# Patient Record
Sex: MALE | Race: WHITE | NOT HISPANIC OR LATINO | URBAN - METROPOLITAN AREA
[De-identification: names, ages, dates, MRNs, and addresses within clinical notes are randomized per-mention and may not be internally consistent; named-entity substitution may affect disease eponyms.]

---

## 2021-11-29 ENCOUNTER — TELEHEALTH PROVIDED OTHER THAN IN PATIENT'S HOME (OUTPATIENT)
Dept: URBAN - METROPOLITAN AREA TELEHEALTH 7 | Facility: TELEHEALTH | Age: 56
End: 2021-11-29

## 2021-11-29 VITALS — WEIGHT: 254 LBS | HEIGHT: 71 IN

## 2021-11-29 DIAGNOSIS — K92.1 MELENA: ICD-10-CM

## 2021-11-29 DIAGNOSIS — R19.5 OTHER FECAL ABNORMALITIES: ICD-10-CM

## 2021-11-29 DIAGNOSIS — R15.2 FECAL URGENCY: ICD-10-CM

## 2021-11-29 PROCEDURE — 99204 OFFICE O/P NEW MOD 45 MIN: CPT | Mod: 95 | Performed by: INTERNAL MEDICINE

## 2021-11-29 NOTE — SERVICEHPINOTES
PATIENT VERIFIED BY DATE OF BIRTH AND NAME. Patient has been consented for this telecommunication visit.   55 yo male with evaluation - has intermittent loose stools.  Pt states he has worse gas and some liquid stools.  Pt tried taking Metamucil which worked in the past.  Pt states the loose stools are urgent.  Pt states worse over the past several months.  Pt has issues with hemorrhoids as well.  Pt states he thinks he has external hemorrhoids.  Pt PCP told him to try Sitz Baths.  Pt does have some blood in his stool/BRBPR.  Pt states he is o/w doing ok.  Pt states his appetite and weight are normal.  Pt denies any pain for the most part.  Pt has been changing his meds - LT4 for his Thyroid disease.  Pt states has pain in his neck at times - seen by PCP for this as well.  Pt has Multiple Myeloma since 2010 - tx in at [facility].  No new meds since 2019 except thyroid med change.

## 2021-12-07 ENCOUNTER — OFFICE (OUTPATIENT)
Dept: URBAN - METROPOLITAN AREA CLINIC 34 | Facility: CLINIC | Age: 56
End: 2021-12-07

## 2021-12-07 PROCEDURE — 00038: CPT | Performed by: INTERNAL MEDICINE

## 2022-01-04 ENCOUNTER — OFFICE (OUTPATIENT)
Dept: URBAN - METROPOLITAN AREA CLINIC 30 | Facility: CLINIC | Age: 57
End: 2022-01-04

## 2022-01-04 ENCOUNTER — OFFICE (OUTPATIENT)
Dept: URBAN - METROPOLITAN AREA PATHOLOGY 18 | Facility: PATHOLOGY | Age: 57
End: 2022-01-04

## 2022-01-04 VITALS
TEMPERATURE: 97.3 F | SYSTOLIC BLOOD PRESSURE: 128 MMHG | TEMPERATURE: 97.9 F | HEART RATE: 76 BPM | RESPIRATION RATE: 12 BRPM | HEART RATE: 80 BPM | SYSTOLIC BLOOD PRESSURE: 122 MMHG | RESPIRATION RATE: 14 BRPM | HEART RATE: 79 BPM | WEIGHT: 250 LBS | OXYGEN SATURATION: 98 % | DIASTOLIC BLOOD PRESSURE: 92 MMHG | SYSTOLIC BLOOD PRESSURE: 119 MMHG | DIASTOLIC BLOOD PRESSURE: 87 MMHG | HEART RATE: 77 BPM | OXYGEN SATURATION: 96 % | SYSTOLIC BLOOD PRESSURE: 124 MMHG | OXYGEN SATURATION: 99 % | RESPIRATION RATE: 16 BRPM | HEART RATE: 72 BPM | DIASTOLIC BLOOD PRESSURE: 79 MMHG | HEIGHT: 71 IN | SYSTOLIC BLOOD PRESSURE: 132 MMHG | DIASTOLIC BLOOD PRESSURE: 85 MMHG | OXYGEN SATURATION: 97 % | DIASTOLIC BLOOD PRESSURE: 90 MMHG

## 2022-01-04 DIAGNOSIS — R19.5 OTHER FECAL ABNORMALITIES: ICD-10-CM

## 2022-01-04 DIAGNOSIS — K92.1 MELENA: ICD-10-CM

## 2022-01-04 DIAGNOSIS — R15.2 FECAL URGENCY: ICD-10-CM

## 2022-01-04 PROCEDURE — 88305 TISSUE EXAM BY PATHOLOGIST: CPT | Performed by: PATHOLOGY

## 2022-01-24 ENCOUNTER — TELEHEALTH PROVIDED OTHER THAN IN PATIENT'S HOME (OUTPATIENT)
Dept: URBAN - METROPOLITAN AREA TELEHEALTH 7 | Facility: TELEHEALTH | Age: 57
End: 2022-01-24

## 2022-01-24 VITALS — HEIGHT: 71 IN | WEIGHT: 250 LBS

## 2022-01-24 DIAGNOSIS — R15.2 FECAL URGENCY: ICD-10-CM

## 2022-01-24 DIAGNOSIS — R19.5 OTHER FECAL ABNORMALITIES: ICD-10-CM

## 2022-01-24 DIAGNOSIS — K92.1 MELENA: ICD-10-CM

## 2022-01-24 PROCEDURE — 99213 OFFICE O/P EST LOW 20 MIN: CPT | Mod: 95 | Performed by: INTERNAL MEDICINE

## 2022-01-24 NOTE — SERVICEHPINOTES
PATIENT VERIFIED BY DATE OF BIRTH AND NAME. Patient has been consented for this telecommunication visit.   55 yo male with hx of loose stools in the past, had colonoscopy and was normal with normal bx - negative for colitis.  Pt is doing better, less stress in his life more recently.  Pt has a family hx of IBS in his brother and daughter.  Pt now feels better.  Pt denies any blood in his stool recently.  Pt states mostly when his hemorrhoids flare he gets blood.  Pt appetite and weight are stable.  ROS o/w negative.